# Patient Record
Sex: MALE | Race: BLACK OR AFRICAN AMERICAN | NOT HISPANIC OR LATINO | Employment: FULL TIME | URBAN - METROPOLITAN AREA
[De-identification: names, ages, dates, MRNs, and addresses within clinical notes are randomized per-mention and may not be internally consistent; named-entity substitution may affect disease eponyms.]

---

## 2017-08-30 PROBLEM — F32.A DEPRESSIVE DISORDER: Status: ACTIVE | Noted: 2017-08-30

## 2017-08-30 NOTE — ED INITIAL ASSESSMENT (HPI)
Pt rpt feeling SI ideations, Pt hx depression, prescribed meds and is not taking them.  Pt's girlfriend called [phone]

## 2017-08-30 NOTE — ED PROVIDER NOTES
Patient Seen in: [facility] Emergency Department    History   Patient presents with:  Eval-P (psychiatric)    Stated Complaint: Eval P    HPI    69-year-old male who has a history of depression who is not on any medications for the past 6 months was normal. No murmurs, regular rate and rhythm  Lungs: Clear to auscultation bilaterally  Abdomen: Soft nontender nondistended normal active bowel sounds without rebound, guarding or masses noted  Back nontender without CVA tenderness  Extremity no clubbing,

## 2017-08-30 NOTE — BH LEVEL OF CARE ASSESSMENT
Level of Care Assessment Note  General Questions  Why are you here?: Pt is a 21yr old male who arrived to the ER via police due to sending suicidal texts to his girlfriend today.  Pt states, \"My gf called the police because I was telling her I was having s attempting suicide; hx of drug use in family  Collateral Information Obtained: In person, Collateral  Collateral Information: ER RN and MD: pt has a hx of depression and was brought to the ER for evaluation of SI.  Pt reports he hasn't taken his medication 08/29/17  Describe Current/Recent/Future Suicide Plan: \"I'd try to overdose again or try to shoot myself. \" Pt denies access to weapons. Pt denies intent.    Current/Recent/Future Suicidal Intent: No  Current/Recent Suicide Rehearsal: No  Suicide Attempt i attempted SI in 2010  Past Suicide Risk Collateral Provided By[de-identified] girlfriend  Describe Past Suicide Risk Collateral: Per girlfriend, pt told her that he attempted SI before high school and that it was a cry for help.     Danger to Others/Property  Current/R Other (Comment); Social phobias  Panic Attacks: \"kind of\" feels anxious lately; pt denied hx of panic attacks; pt reports hx of social anxiety  Trauma Reaction: Other (pt denied)  Bipolar Symptoms: No problems reported or observed  Sleep Pattern: Sleeps a Withdrawal Symptoms: No  Process Addiction/ Behaviors  Repetitive/Compulsive Behaviors in the past 30 days: No                 Functional Impairment  Currently Attending School: No  Employment Status: Employed (full-time)  Job Issues:  Other (comment) (\"[employer]  Exhibited Behavior : Appropriate to situation; Cooperative;Participated  Gait/Movement: Other (Comment) (maye; pt was laying on hospital bed)  Speech Characteristics: Normal rate;Normal rhythm; Soft  Concentration: Unimpaired  Memory: Recent memory int attempt in 2010 via overdose.     Level of Care Recommendations  Consulted with: Dr. Bradford Campbell recommended inpt admission at this time.   Level of Care Recommendation: Inpatient Acute Care  Unit: Adult  Inpatient Criteria: Suicidal/homicidal risk  Precautions:

## 2017-08-30 NOTE — ED NOTES
PT stable at this time, Pt calm and cooperative, no complaints. ER RN collected and safely placed Pt's clothes and belongings in locked locker next to room B-4. Pt informed of seclusion and camera.  Pt provided 2 hospital gowns, socks, pillow, warm blanket,

## 2018-09-07 PROCEDURE — 99284 EMERGENCY DEPT VISIT MOD MDM: CPT

## 2018-09-07 PROCEDURE — 96361 HYDRATE IV INFUSION ADD-ON: CPT

## 2018-09-07 PROCEDURE — 83690 ASSAY OF LIPASE: CPT

## 2018-09-07 PROCEDURE — 80053 COMPREHEN METABOLIC PANEL: CPT

## 2018-09-07 PROCEDURE — 85025 COMPLETE CBC W/AUTO DIFF WBC: CPT

## 2018-09-07 PROCEDURE — 96372 THER/PROPH/DIAG INJ SC/IM: CPT

## 2018-09-07 PROCEDURE — 96374 THER/PROPH/DIAG INJ IV PUSH: CPT

## 2018-09-07 RX ORDER — SODIUM CHLORIDE 0.9 % (FLUSH) 0.9 %
10 SYRINGE (ML) INJECTION AS NEEDED
Status: DISCONTINUED | OUTPATIENT
Start: 2018-09-07 | End: 2018-09-08 | Stop reason: HOSPADM

## 2018-09-08 ENCOUNTER — HOSPITAL ENCOUNTER (EMERGENCY)
Facility: HOSPITAL | Age: 23
Discharge: HOME | End: 2018-09-08
Attending: EMERGENCY MEDICINE | Admitting: EMERGENCY MEDICINE

## 2018-09-08 VITALS
WEIGHT: 175 LBS | HEIGHT: 69 IN | OXYGEN SATURATION: 99 % | HEART RATE: 96 BPM | SYSTOLIC BLOOD PRESSURE: 114 MMHG | BODY MASS INDEX: 25.92 KG/M2 | DIASTOLIC BLOOD PRESSURE: 61 MMHG | RESPIRATION RATE: 18 BRPM | TEMPERATURE: 99.8 F

## 2018-09-08 DIAGNOSIS — R19.7 DIARRHEA, UNSPECIFIED TYPE: Primary | ICD-10-CM

## 2018-09-08 LAB
ALBUMIN SERPL-MCNC: 3.91 G/DL (ref 3.2–4.8)
ALBUMIN/GLOB SERPL: 1.6 G/DL (ref 1.5–2.5)
ALP SERPL-CCNC: 31 U/L (ref 25–100)
ALT SERPL W P-5'-P-CCNC: 46 U/L (ref 7–40)
ANION GAP SERPL CALCULATED.3IONS-SCNC: 13 MMOL/L (ref 3–11)
AST SERPL-CCNC: 27 U/L (ref 0–33)
BACTERIA UR QL AUTO: NORMAL /HPF
BASOPHILS # BLD AUTO: 0.01 10*3/MM3 (ref 0–0.2)
BASOPHILS NFR BLD AUTO: 0.2 % (ref 0–1)
BILIRUB SERPL-MCNC: 0.8 MG/DL (ref 0.3–1.2)
BILIRUB UR QL STRIP: NEGATIVE
BUN BLD-MCNC: 9 MG/DL (ref 9–23)
BUN/CREAT SERPL: 12.5 (ref 7–25)
C DIFF TOX GENS STL QL NAA+PROBE: NOT DETECTED
CALCIUM SPEC-SCNC: 7.5 MG/DL (ref 8.7–10.4)
CHLORIDE SERPL-SCNC: 96 MMOL/L (ref 99–109)
CLARITY UR: CLEAR
CO2 SERPL-SCNC: 25 MMOL/L (ref 20–31)
COLOR UR: YELLOW
CREAT BLD-MCNC: 0.72 MG/DL (ref 0.6–1.3)
DEPRECATED RDW RBC AUTO: 43.6 FL (ref 37–54)
EOSINOPHIL # BLD AUTO: 0 10*3/MM3 (ref 0–0.3)
EOSINOPHIL NFR BLD AUTO: 0 % (ref 0–3)
ERYTHROCYTE [DISTWIDTH] IN BLOOD BY AUTOMATED COUNT: 13.1 % (ref 11.3–14.5)
GFR SERPL CREATININE-BSD FRML MDRD: >150 ML/MIN/1.73
GLOBULIN UR ELPH-MCNC: 2.4 GM/DL
GLUCOSE BLD-MCNC: 95 MG/DL (ref 70–100)
GLUCOSE UR STRIP-MCNC: NEGATIVE MG/DL
HCT VFR BLD AUTO: 39.5 % (ref 38.9–50.9)
HEMOCCULT STL QL: POSITIVE
HGB BLD-MCNC: 13.3 G/DL (ref 13.1–17.5)
HGB UR QL STRIP.AUTO: ABNORMAL
HOLD SPECIMEN: NORMAL
HYALINE CASTS UR QL AUTO: NORMAL /LPF
IMM GRANULOCYTES # BLD: 0.01 10*3/MM3 (ref 0–0.03)
IMM GRANULOCYTES NFR BLD: 0.2 % (ref 0–0.6)
KETONES UR QL STRIP: ABNORMAL
LEUKOCYTE ESTERASE UR QL STRIP.AUTO: NEGATIVE
LIPASE SERPL-CCNC: 25 U/L (ref 6–51)
LYMPHOCYTES # BLD AUTO: 0.66 10*3/MM3 (ref 0.6–4.8)
LYMPHOCYTES NFR BLD AUTO: 11.3 % (ref 24–44)
MCH RBC QN AUTO: 30.8 PG (ref 27–31)
MCHC RBC AUTO-ENTMCNC: 33.7 G/DL (ref 32–36)
MCV RBC AUTO: 91.4 FL (ref 80–99)
MONOCYTES # BLD AUTO: 0.81 10*3/MM3 (ref 0–1)
MONOCYTES NFR BLD AUTO: 13.9 % (ref 0–12)
NEUTROPHILS # BLD AUTO: 4.36 10*3/MM3 (ref 1.5–8.3)
NEUTROPHILS NFR BLD AUTO: 74.6 % (ref 41–71)
NITRITE UR QL STRIP: NEGATIVE
PH UR STRIP.AUTO: 6 [PH] (ref 5–8)
PLATELET # BLD AUTO: 281 10*3/MM3 (ref 150–450)
PMV BLD AUTO: 9.9 FL (ref 6–12)
POTASSIUM BLD-SCNC: 3.5 MMOL/L (ref 3.5–5.5)
PROT SERPL-MCNC: 6.3 G/DL (ref 5.7–8.2)
PROT UR QL STRIP: ABNORMAL
RBC # BLD AUTO: 4.32 10*6/MM3 (ref 4.2–5.76)
RBC # UR: NORMAL /HPF
REF LAB TEST METHOD: NORMAL
RV AG STL QL IA: NEGATIVE
SODIUM BLD-SCNC: 134 MMOL/L (ref 132–146)
SP GR UR STRIP: 1.02 (ref 1–1.03)
SQUAMOUS #/AREA URNS HPF: NORMAL /HPF
UROBILINOGEN UR QL STRIP: ABNORMAL
WBC NRBC COR # BLD: 5.84 10*3/MM3 (ref 3.5–10.8)
WBC UR QL AUTO: NORMAL /HPF
WHOLE BLOOD HOLD SPECIMEN: NORMAL
WHOLE BLOOD HOLD SPECIMEN: NORMAL

## 2018-09-08 PROCEDURE — 87186 SC STD MICRODIL/AGAR DIL: CPT | Performed by: NURSE PRACTITIONER

## 2018-09-08 PROCEDURE — 81001 URINALYSIS AUTO W/SCOPE: CPT | Performed by: EMERGENCY MEDICINE

## 2018-09-08 PROCEDURE — 83631 LACTOFERRIN FECAL (QUANT): CPT | Performed by: NURSE PRACTITIONER

## 2018-09-08 PROCEDURE — 87272 CRYPTOSPORIDIUM AG IF: CPT | Performed by: NURSE PRACTITIONER

## 2018-09-08 PROCEDURE — 87046 STOOL CULTR AEROBIC BACT EA: CPT | Performed by: NURSE PRACTITIONER

## 2018-09-08 PROCEDURE — 87329 GIARDIA AG IA: CPT | Performed by: NURSE PRACTITIONER

## 2018-09-08 PROCEDURE — 87425 ROTAVIRUS AG IA: CPT | Performed by: NURSE PRACTITIONER

## 2018-09-08 PROCEDURE — 82272 OCCULT BLD FECES 1-3 TESTS: CPT | Performed by: NURSE PRACTITIONER

## 2018-09-08 PROCEDURE — 25010000002 DICYCLOMINE PER 20 MG: Performed by: NURSE PRACTITIONER

## 2018-09-08 PROCEDURE — 87045 FECES CULTURE AEROBIC BACT: CPT | Performed by: NURSE PRACTITIONER

## 2018-09-08 PROCEDURE — 96372 THER/PROPH/DIAG INJ SC/IM: CPT

## 2018-09-08 PROCEDURE — 96374 THER/PROPH/DIAG INJ IV PUSH: CPT

## 2018-09-08 PROCEDURE — 87493 C DIFF AMPLIFIED PROBE: CPT | Performed by: NURSE PRACTITIONER

## 2018-09-08 PROCEDURE — 87077 CULTURE AEROBIC IDENTIFY: CPT | Performed by: NURSE PRACTITIONER

## 2018-09-08 PROCEDURE — 96361 HYDRATE IV INFUSION ADD-ON: CPT

## 2018-09-08 PROCEDURE — 25010000002 ONDANSETRON PER 1 MG: Performed by: NURSE PRACTITIONER

## 2018-09-08 RX ORDER — VENLAFAXINE 75 MG/1
75 TABLET ORAL 2 TIMES DAILY
COMMUNITY

## 2018-09-08 RX ORDER — DICYCLOMINE HCL 20 MG
20 TABLET ORAL EVERY 6 HOURS
Qty: 24 TABLET | Refills: 0 | Status: SHIPPED | OUTPATIENT
Start: 2018-09-08

## 2018-09-08 RX ORDER — ONDANSETRON 2 MG/ML
4 INJECTION INTRAMUSCULAR; INTRAVENOUS ONCE
Status: COMPLETED | OUTPATIENT
Start: 2018-09-08 | End: 2018-09-08

## 2018-09-08 RX ORDER — DICYCLOMINE HYDROCHLORIDE 10 MG/ML
20 INJECTION INTRAMUSCULAR ONCE
Status: COMPLETED | OUTPATIENT
Start: 2018-09-08 | End: 2018-09-08

## 2018-09-08 RX ORDER — ONDANSETRON 4 MG/1
4 TABLET, ORALLY DISINTEGRATING ORAL 4 TIMES DAILY PRN
Qty: 16 TABLET | Refills: 0 | Status: SHIPPED | OUTPATIENT
Start: 2018-09-08

## 2018-09-08 RX ORDER — SODIUM CHLORIDE 0.9 % (FLUSH) 0.9 %
10 SYRINGE (ML) INJECTION AS NEEDED
Status: DISCONTINUED | OUTPATIENT
Start: 2018-09-08 | End: 2018-09-08 | Stop reason: HOSPADM

## 2018-09-08 RX ADMIN — SODIUM CHLORIDE 1000 ML: 9 INJECTION, SOLUTION INTRAVENOUS at 02:36

## 2018-09-08 RX ADMIN — DICYCLOMINE HYDROCHLORIDE 20 MG: 20 INJECTION, SOLUTION INTRAMUSCULAR at 02:44

## 2018-09-08 RX ADMIN — ONDANSETRON 4 MG: 2 INJECTION INTRAMUSCULAR; INTRAVENOUS at 02:37

## 2018-09-08 NOTE — ED PROVIDER NOTES
Subjective   Jean Carlos Gillis is a 22 y.o.male who presents to the ED with c/o diarrhea with onset at 1600 yesterday. He reports that he suddenly developed diarrhea with abd cramping and chills. He denies any N/V. He has attempted pepto bismol with minimal relief. He denies any recent antibiotic use. There are no other acute complaints at this time. He notes that he has been into contact with people at his workplace with similar sx.        History provided by:  Patient  Diarrhea   The primary symptoms include abdominal pain and diarrhea. Primary symptoms do not include fever, nausea, vomiting or dysuria. The illness began yesterday. The onset was sudden. The problem has been gradually worsening.   The illness is also significant for chills.       Review of Systems   Constitutional: Positive for chills. Negative for fever.   Respiratory: Negative for shortness of breath.    Cardiovascular: Negative for chest pain.   Gastrointestinal: Positive for abdominal pain and diarrhea. Negative for nausea and vomiting.   Genitourinary: Negative for difficulty urinating and dysuria.   All other systems reviewed and are negative.      Past Medical History:   Diagnosis Date   • Depression        No Known Allergies    History reviewed. No pertinent surgical history.    History reviewed. No pertinent family history.    Social History     Social History   • Marital status: Single     Social History Main Topics   • Smoking status: Never Smoker   • Alcohol use No   • Drug use: No     Other Topics Concern   • Not on file         Objective   Physical Exam   Constitutional: He is oriented to person, place, and time. He appears well-developed and well-nourished. No distress.   HENT:   Head: Normocephalic and atraumatic.   Right Ear: External ear normal.   Left Ear: External ear normal.   Nose: Nose normal.   Mouth/Throat: Oropharynx is clear and moist.   Eyes: Pupils are equal, round, and reactive to light. Conjunctivae and EOM are  normal. No scleral icterus.   Neck: Normal range of motion. Neck supple.   Cardiovascular: Normal rate, regular rhythm and normal heart sounds.    No murmur heard.  Pulmonary/Chest: Effort normal and breath sounds normal. No respiratory distress.   Abdominal: Soft. Bowel sounds are normal. He exhibits no distension. There is no tenderness.   Musculoskeletal: Normal range of motion. He exhibits no edema or tenderness.   Neurological: He is alert and oriented to person, place, and time.   Skin: Skin is warm and dry.   Psychiatric: He has a normal mood and affect. His behavior is normal.   Nursing note and vitals reviewed.      Procedures         ED Course  ED Course as of Sep 08 0404   Sat Sep 08, 2018   0356 Pt is  advised of  his results at this time.  Patient will be discharged home.  Patient to follow-up with primary care physician.  Patient agrees and verbalizes understanding.  Patient will be sent home with a prescription for Bentyl and Zofran.  To return as needed.  Stool cultures are pending.  [KG]      ED Course User Index  [KG] Jerica Gillis, FRANCK     Recent Results (from the past 24 hour(s))   Comprehensive Metabolic Panel    Collection Time: 09/07/18 11:36 PM   Result Value Ref Range    Glucose 95 70 - 100 mg/dL    BUN 9 9 - 23 mg/dL    Creatinine 0.72 0.60 - 1.30 mg/dL    Sodium 134 132 - 146 mmol/L    Potassium 3.5 3.5 - 5.5 mmol/L    Chloride 96 (L) 99 - 109 mmol/L    CO2 25.0 20.0 - 31.0 mmol/L    Calcium 7.5 (L) 8.7 - 10.4 mg/dL    Total Protein 6.3 5.7 - 8.2 g/dL    Albumin 3.91 3.20 - 4.80 g/dL    ALT (SGPT) 46 (H) 7 - 40 U/L    AST (SGOT) 27 0 - 33 U/L    Alkaline Phosphatase 31 25 - 100 U/L    Total Bilirubin 0.8 0.3 - 1.2 mg/dL    eGFR  African Amer >150 >60 mL/min/1.73    Globulin 2.4 gm/dL    A/G Ratio 1.6 1.5 - 2.5 g/dL    BUN/Creatinine Ratio 12.5 7.0 - 25.0    Anion Gap 13.0 (H) 3.0 - 11.0 mmol/L   Lipase    Collection Time: 09/07/18 11:36 PM   Result Value Ref Range    Lipase 25 6 - 51  U/L   Light Blue Top    Collection Time: 09/07/18 11:36 PM   Result Value Ref Range    Extra Tube hold for add-on    Green Top (Gel)    Collection Time: 09/07/18 11:36 PM   Result Value Ref Range    Extra Tube Hold for add-ons.    Lavender Top    Collection Time: 09/07/18 11:36 PM   Result Value Ref Range    Extra Tube hold for add-on    Gold Top - SST    Collection Time: 09/07/18 11:36 PM   Result Value Ref Range    Extra Tube Hold for add-ons.    Green Top (No Gel)    Collection Time: 09/07/18 11:36 PM   Result Value Ref Range    Extra Tube Hold for add-ons.    CBC Auto Differential    Collection Time: 09/07/18 11:36 PM   Result Value Ref Range    WBC 5.84 3.50 - 10.80 10*3/mm3    RBC 4.32 4.20 - 5.76 10*6/mm3    Hemoglobin 13.3 13.1 - 17.5 g/dL    Hematocrit 39.5 38.9 - 50.9 %    MCV 91.4 80.0 - 99.0 fL    MCH 30.8 27.0 - 31.0 pg    MCHC 33.7 32.0 - 36.0 g/dL    RDW 13.1 11.3 - 14.5 %    RDW-SD 43.6 37.0 - 54.0 fl    MPV 9.9 6.0 - 12.0 fL    Platelets 281 150 - 450 10*3/mm3    Neutrophil % 74.6 (H) 41.0 - 71.0 %    Lymphocyte % 11.3 (L) 24.0 - 44.0 %    Monocyte % 13.9 (H) 0.0 - 12.0 %    Eosinophil % 0.0 0.0 - 3.0 %    Basophil % 0.2 0.0 - 1.0 %    Immature Grans % 0.2 0.0 - 0.6 %    Neutrophils, Absolute 4.36 1.50 - 8.30 10*3/mm3    Lymphocytes, Absolute 0.66 0.60 - 4.80 10*3/mm3    Monocytes, Absolute 0.81 0.00 - 1.00 10*3/mm3    Eosinophils, Absolute 0.00 0.00 - 0.30 10*3/mm3    Basophils, Absolute 0.01 0.00 - 0.20 10*3/mm3    Immature Grans, Absolute 0.01 0.00 - 0.03 10*3/mm3   Urinalysis With Microscopic If Indicated (No Culture) - Urine, Clean Catch    Collection Time: 09/08/18  2:11 AM   Result Value Ref Range    Color, UA Yellow Yellow, Straw    Appearance, UA Clear Clear    pH, UA 6.0 5.0 - 8.0    Specific Gravity, UA 1.023 1.001 - 1.030    Glucose, UA Negative Negative    Ketones, UA 15 mg/dL (1+) (A) Negative    Bilirubin, UA Negative Negative    Blood, UA Small (1+) (A) Negative    Protein, UA Trace  "(A) Negative    Leuk Esterase, UA Negative Negative    Nitrite, UA Negative Negative    Urobilinogen, UA 0.2 E.U./dL 0.2 - 1.0 E.U./dL   Urinalysis, Microscopic Only - Urine, Clean Catch    Collection Time: 09/08/18  2:11 AM   Result Value Ref Range    RBC, UA 0-2 None Seen, 0-2 /HPF    WBC, UA 0-2 None Seen, 0-2 /HPF    Bacteria, UA None Seen None Seen, Trace /HPF    Squamous Epithelial Cells, UA 0-2 None Seen, 0-2 /HPF    Hyaline Casts, UA 0-6 0 - 6 /LPF    Methodology Automated Microscopy      Note: In addition to lab results from this visit, the labs listed above may include labs taken at another facility or during a different encounter within the last 24 hours. Please correlate lab times with ED admission and discharge times for further clarification of the services performed during this visit.    No orders to display     Vitals:    09/07/18 2328 09/08/18 0259 09/08/18 0300 09/08/18 0330   BP: 127/59  129/79 128/88   BP Location: Left arm      Patient Position: Sitting      Pulse: 115 97  97   Resp: 16      Temp: 99.8 °F (37.7 °C)      TempSrc: Oral      SpO2: 97% 97%  99%   Weight: 79.4 kg (175 lb)      Height: 175.3 cm (69\")        Medications   sodium chloride 0.9 % flush 10 mL (not administered)   sodium chloride 0.9 % flush 10 mL (not administered)   sodium chloride 0.9 % bolus 1,000 mL (0 mL Intravenous Stopped 9/8/18 0340)   ondansetron (ZOFRAN) injection 4 mg (4 mg Intravenous Given 9/8/18 0237)   dicyclomine (BENTYL) injection 20 mg (20 mg Intramuscular Given 9/8/18 0244)     ECG/EMG Results (last 24 hours)     ** No results found for the last 24 hours. **                        [facility]    Final diagnoses:   Diarrhea, unspecified type       Documentation assistance provided by julius Lock.  Information recorded by the scribe was done at my direction and has been verified and validated by me.     Joel Lock  09/08/18 0044       Jerica Gillis APRN  09/08/18 6019    "

## 2018-09-10 LAB
CRYPTOSP STL QL ACID FAST STN: NORMAL
G LAMBLIA AG STL QL IA: NEGATIVE

## 2018-09-11 NOTE — ED PROVIDER NOTES
"Telephone call at 1815 from patient returning my call from 1812 hrs. advised positive salmonella stool culture results.  Patient states he is feeling somewhat better, still having to 6 low volume watery diarrheal stools per day with stomach cramping nausea and one bout of emesis today.  Was seen at urgent care, and prescribed \"an antibiotic\", patient does not know name of antibiotic.  States he is feeling better, slightly orthostatic occasionally, but is keeping fluids down.  No fever no abdominal pain no hematochezia nor hematemesis.  Offered patient to come to ED for reevaluation rehydration, patient states if his symptoms change or worsen he will do so.       Jun Foote PA-C  09/11/18 7576    "

## 2018-09-14 LAB — LACTOFERRIN SER-MCNC: 230.8 UG/ML(G) (ref 0–7.24)

## 2018-10-03 LAB — BACTERIA SPEC AEROBE CULT: ABNORMAL

## 2022-04-27 NOTE — DISCHARGE INSTRUCTIONS
Follow up with one of the [facility] physician groups below to setup primary care. If you have trouble following up, please call Edith Muñoz, our transitional care nurse, at [phone].    (Dr. Becker, Dr. Sue, Dr. Warren, and Dr. Grady.)  [facility], Primary Care, [phone], [address], [place] [address]    [facility], Primary Care, [phone], [address], [address] [place], [address] [facility]) [facility], Primary Care, [phone], [address] [place], [address] [facility], Primary Care, [phone], [address], [address] [place], [address]     [place] [address] [facility], Primary Care, [phone], [address], [address] [place], [address]    [place] [address] [facility], Primary Care, [phone], [address]    [place], [address] [facility], Primary Care, [phone], [address] [place], [address] [facility], Primary Care, [phone], [address], [address] [place], [address] [facility], Primary Care, [phone], [address], [address] [place], [address] [facility], Primary Care, [phone], [address], [address] [place], [address] [facility], Primary Care, [phone], [address], [place], [address]     [facility], Primary Care, [phone], [address] [id number]     Heterosexual